# Patient Record
Sex: FEMALE | Race: AMERICAN INDIAN OR ALASKA NATIVE | Employment: FULL TIME | URBAN - METROPOLITAN AREA
[De-identification: names, ages, dates, MRNs, and addresses within clinical notes are randomized per-mention and may not be internally consistent; named-entity substitution may affect disease eponyms.]

---

## 2018-09-20 ENCOUNTER — TRANSFERRED RECORDS (OUTPATIENT)
Dept: HEALTH INFORMATION MANAGEMENT | Facility: CLINIC | Age: 68
End: 2018-09-20

## 2019-02-06 ENCOUNTER — DOCUMENTATION ONLY (OUTPATIENT)
Dept: CARE COORDINATION | Facility: CLINIC | Age: 69
End: 2019-02-06

## 2019-02-11 NOTE — TELEPHONE ENCOUNTER
FUTURE VISIT INFORMATION      FUTURE VISIT INFORMATION:    Date: 2/15/19    Time: 9:20AM    Location: [facility] [place]  REFERRAL INFORMATION:    Referring provider:  Kayce Suarez DNP    Referring providers clinic:  [facility]    Reason for visit/diagnosis:  Difficulty swallowing food     NOTES STATUS DETAILS   OFFICE NOTE from referring provider Internal 9/20/18   OFFICE NOTE from other specialist N/A    DISCHARGE SUMMARY from hospital Internal    OPERATIVE REPORT N/A    MEDICATION LIST Received         ENDOSCOPY  N/A    COLONOSCOPY Care Everywhere Flex Sig 9/11/17   ERCP N/A    EUS N/A    STOOL TESTING Care Everywhere    PERTINENT LABS Care Everywhere    PATHOLOGY REPORTS (RELATED) N/A    IMAGING (CT, MRI, EGD) Care Everywhere PACS

## 2019-02-14 ENCOUNTER — TELEPHONE (OUTPATIENT)
Dept: GASTROENTEROLOGY | Facility: CLINIC | Age: 69
End: 2019-02-14

## 2019-02-14 NOTE — TELEPHONE ENCOUNTER
Called and received message stating vmail box full, unable to leave reminder for patient of appointment for 2/15/19 at 9:20AM.

## 2019-02-15 ENCOUNTER — OFFICE VISIT (OUTPATIENT)
Dept: GASTROENTEROLOGY | Facility: CLINIC | Age: 69
End: 2019-02-15
Payer: MEDICARE

## 2019-02-15 ENCOUNTER — TELEPHONE (OUTPATIENT)
Dept: GASTROENTEROLOGY | Facility: CLINIC | Age: 69
End: 2019-02-15

## 2019-02-15 ENCOUNTER — PRE VISIT (OUTPATIENT)
Dept: GASTROENTEROLOGY | Facility: CLINIC | Age: 69
End: 2019-02-15

## 2019-02-15 VITALS
TEMPERATURE: 97.5 F | DIASTOLIC BLOOD PRESSURE: 73 MMHG | SYSTOLIC BLOOD PRESSURE: 152 MMHG | HEART RATE: 85 BPM | WEIGHT: 179.7 LBS | OXYGEN SATURATION: 97 % | BODY MASS INDEX: 33.07 KG/M2 | HEIGHT: 62 IN

## 2019-02-15 DIAGNOSIS — R13.10 DYSPHAGIA, UNSPECIFIED TYPE: ICD-10-CM

## 2019-02-15 DIAGNOSIS — K21.9 GASTROESOPHAGEAL REFLUX DISEASE, ESOPHAGITIS PRESENCE NOT SPECIFIED: Primary | ICD-10-CM

## 2019-02-15 RX ORDER — NICOTINE POLACRILEX 4 MG/1
20 GUM, CHEWING ORAL DAILY
Qty: 90 TABLET | Refills: 3 | Status: SHIPPED | OUTPATIENT
Start: 2019-02-15 | End: 2020-02-15

## 2019-02-15 RX ORDER — ESCITALOPRAM OXALATE 10 MG/1
TABLET ORAL
Refills: 3 | COMMUNITY
Start: 2019-01-26

## 2019-02-15 RX ORDER — CHOLECALCIFEROL (VITAMIN D3) 50 MCG
1 TABLET ORAL DAILY
COMMUNITY

## 2019-02-15 RX ORDER — PERPHENAZINE/AMITRIPTYLINE HCL 4MG-10MG
TABLET ORAL DAILY
COMMUNITY

## 2019-02-15 RX ORDER — BUPROPION HYDROCHLORIDE 150 MG/1
150 TABLET, EXTENDED RELEASE ORAL
COMMUNITY
Start: 2016-12-26

## 2019-02-15 RX ORDER — ATORVASTATIN CALCIUM 40 MG/1
TABLET, FILM COATED ORAL
Refills: 1 | COMMUNITY
Start: 2019-01-26

## 2019-02-15 RX ORDER — LEVOTHYROXINE SODIUM 137 UG/1
TABLET ORAL
Refills: 2 | COMMUNITY
Start: 2019-01-26

## 2019-02-15 RX ORDER — OXYBUTYNIN CHLORIDE 10 MG/1
1 TABLET, EXTENDED RELEASE ORAL
COMMUNITY
Start: 2018-05-01

## 2019-02-15 RX ORDER — LISINOPRIL 10 MG/1
TABLET ORAL
Refills: 1 | COMMUNITY
Start: 2018-10-29

## 2019-02-15 RX ORDER — CYCLOBENZAPRINE HCL 5 MG
TABLET ORAL
Refills: 0 | COMMUNITY
Start: 2019-01-24

## 2019-02-15 RX ORDER — CLOPIDOGREL BISULFATE 75 MG/1
75 TABLET ORAL DAILY
Refills: 0 | COMMUNITY
Start: 2019-02-03

## 2019-02-15 SDOH — HEALTH STABILITY: MENTAL HEALTH: HOW OFTEN DO YOU HAVE A DRINK CONTAINING ALCOHOL?: NEVER

## 2019-02-15 ASSESSMENT — ENCOUNTER SYMPTOMS
BACK PAIN: 1
HOARSE VOICE: 0
MYALGIAS: 1
JOINT SWELLING: 0
SORE THROAT: 0
TROUBLE SWALLOWING: 1
MUSCLE WEAKNESS: 0
NECK PAIN: 0
SMELL DISTURBANCE: 0
TASTE DISTURBANCE: 0
NECK MASS: 0
STIFFNESS: 0
MUSCLE CRAMPS: 0
ARTHRALGIAS: 0
SINUS PAIN: 0
SINUS CONGESTION: 0

## 2019-02-15 ASSESSMENT — PATIENT HEALTH QUESTIONNAIRE - PHQ9
SUM OF ALL RESPONSES TO PHQ QUESTIONS 1-9: 6
10. IF YOU CHECKED OFF ANY PROBLEMS, HOW DIFFICULT HAVE THESE PROBLEMS MADE IT FOR YOU TO DO YOUR WORK, TAKE CARE OF THINGS AT HOME, OR GET ALONG WITH OTHER PEOPLE: VERY DIFFICULT
SUM OF ALL RESPONSES TO PHQ QUESTIONS 1-9: 6

## 2019-02-15 ASSESSMENT — MIFFLIN-ST. JEOR: SCORE: 1288.36

## 2019-02-15 ASSESSMENT — PAIN SCALES - GENERAL: PAINLEVEL: NO PAIN (0)

## 2019-02-15 NOTE — PROGRESS NOTES
GI CLINIC VISIT  2/15/19    CC/REFERRING MD: Kayce Suarez DNP  REASON FOR CONSULTATION: Difficulty Swallowing     ASSESSMENT/PLAN:  Adilia Bhardwaj is a 68 year old female with past medical history of CVA x 3, prior cholecystectomy, COPD, bilateral angioplasty of the right and left iliac arteries who is presenting for evaluation of dysphagia.     1. Dysphagia/Odynophagia  2. Reflux  Differential diagnosis includes esophagitis in setting of increased reflux, malignancy (given smoking history, progressive dysphagia), esophageal dysmotility, peptic stricture, etc. Will start PPI for reflux symptoms and schedule EGD with MAC given significant co-morbidities for further evaluation.    -- EGD with anesthesia at your convenience  -- GERD lifestyle modifications  -- Start omeprazole 20 mg 30 minutes prior to breakfast for acid reflux  -- Smoking cessation counseling    RTC 3 months    Thank you for this consultation.  It was a pleasure to participate in the care of this patient; please contact us with any further questions.     Plan of care discussed with Dr. Moreno, gastroenterology attending physician.     Dr. Caroline Quintana/Dafne MARTINEZ CNP  Division of Gastroenterology, Hepatology and Nutrition  [facility]  Adilia Bhardwaj is a 68 year old female with past medical history of CVA x 3, prior cholecystectomy, COPD, bilateral angioplasty of the right and left iliac arteries who is presenting for evaluation of dysphagia.     Patient reports difficulty swallowing food and associated odynophagia with both food and liquids for the past two weeks. Previously just food for the past six months or so. She feels like oral intake gets stuck in her lower neck. She has also noted hoarseness of her voice for the past 2-3 days. She has had significant heartburn and acid regurgitation over the past 2-3 months, worse at night. She is taking Tums for this. Her appetite is stable. She denies shortness of breath, skin  rashes. She has two bowel movements daily, no melena or hematochezia.     Reports she has never had an EGD. Had a colonoscopy one year ago and was told she had polyps.     ROS:  10 point ROS reviewed and negative aside from what is noted in HPI.     PERTINENT PAST MEDICAL HISTORY:  Hypertension  Cardiovascular accident x 3  COPD    PREVIOUS SURGERIES:  Cholecystectomy  Bilateral iliac artery angioplasty  Hysterectomy    PREVIOUS ENDOSCOPY: Reviewed.     ALLERGIES: No known drug allergies.     PERTINENT MEDICATIONS: Reviewed.     Current Outpatient Medications:      acetaminophen-codeine (TYLENOL #3) 300-30 MG tablet, TAKE ONE TAB BY MOUTH EVERY 12 HOURS AS NEEDED FOR PAIN, Disp: , Rfl: 0     amitriptyline (ELAVIL) 25 MG tablet, TAKE ONE TABLET BY MOUTH EVERY EVENING FOR INSOMNIA, Disp: , Rfl: 0     atorvastatin (LIPITOR) 40 MG tablet, TAKE ONE TABLET BY MOUTH DAILY AT BEDTIME, Disp: , Rfl: 1     buPROPion (WELLBUTRIN SR) 150 MG 12 hr tablet, Take 150 mg by mouth, Disp: , Rfl:      clopidogrel (PLAVIX) 75 MG tablet, Take 75 mg by mouth daily, Disp: , Rfl: 0     Coconut Oil 1000 MG CAPS, Take by mouth daily, Disp: , Rfl:      cyclobenzaprine (FLEXERIL) 5 MG tablet, TAKE 1/2 TABLET EVERY 8-12 HOURS AS NEEDED FOR MUSCLE SPASM., Disp: , Rfl: 0     escitalopram (LEXAPRO) 10 MG tablet, TAKE 1 TABLET BY MOUTH ONCE DAILY, MAY INCREASE TO TWO TABLETS AFTER ONE WEEK IF TOLERATING, Disp: , Rfl: 3     levothyroxine (SYNTHROID/LEVOTHROID) 137 MCG tablet, TAKE ONE BY MOUTH DAILY, Disp: , Rfl: 2     lisinopril (PRINIVIL/ZESTRIL) 10 MG tablet, TAKE ONE BY MOUTH DAILY - FOR BLOOD PRESSURE, Disp: , Rfl: 1     omeprazole 20 MG tablet, Take 1 tablet (20 mg) by mouth daily, Disp: 90 tablet, Rfl: 3     oxybutynin ER (DITROPAN-XL) 10 MG 24 hr tablet, Take 1 tablet by mouth, Disp: , Rfl:      tiZANidine (ZANAFLEX) 4 MG tablet, Take 4 mg by mouth 3 times daily, Disp: , Rfl:      vitamin D3 (CHOLECALCIFEROL) 2000 units tablet, Take 1 tablet  by mouth daily, Disp: , Rfl:     SOCIAL HISTORY:  Social History     Socioeconomic History     Marital status: Single     Spouse name: Not on file     Number of children: Not on file     Years of education: Not on file     Highest education level: Not on file   Social Needs     Financial resource strain: Not on file     Food insecurity - worry: Not on file     Food insecurity - inability: Not on file     Transportation needs - medical: Not on file     Transportation needs - non-medical: Not on file   Occupational History     Not on file   Tobacco Use     Smoking status: Current Every Day Smoker     Types: Cigarettes     Smokeless tobacco: Never Used   Substance and Sexual Activity     Alcohol use: No     Frequency: Never     Drug use: No     Sexual activity: Not on file   Other Topics Concern     Not on file   Social History Narrative     Not on file   Smokes six cigarettes daily. No alcohol or recreational drug use.     FAMILY HISTORY:  FH of CRC:None   FH of IBD: None     Past/family/social history reviewed and no changes    PHYSICAL EXAMINATION:  Vitals reviewed: There were no vitals taken for this visit.  Wt:   Wt Readings from Last 2 Encounters:   No data found for Wt   Constitutional: aaox3, cooperative, pleasant, not dyspneic/diaphoretic, no acute distress  Eyes: Sclera anicteric/injected  Ears/nose/mouth/throat: Normal oropharynx without ulcers or exudate, mucus membranes moist, hearing intact  Neck: supple, thyroid normal size  CV: No edema  Respiratory: Unlabored breathing  Lymph: No axillary, submandibular, supraclavicular lymphadenopathy  Abd:  Nondistended, +bs, no hepatosplenomegaly, nontender, no peritoneal signs  Skin: warm, perfused, no jaundice  Psych: Normal affect  MSK: Normal gait      PERTINENT STUDIES: Reviewed.

## 2019-02-15 NOTE — Clinical Note
2/15/2019       RE: Adilia Bhardwaj  [address] [facility] [address]     Dear Colleague,    Thank you for referring your patient, Adilia Bhardwaj, to the [facility] GASTROENTEROLOGY AND IBD CLINIC at [facility]. Please see a copy of my visit note below.    GI CLINIC VISIT    CC/REFERRING MD: Kayce Suarez DNP  REASON FOR CONSULTATION: Difficulty Swallowing     ASSESSMENT/PLAN:  Adilia Bhardwaj woman with past medical history of CVA x 3,ventricular hypertrophy, cholecystectomy, bilateral angioplasty of the right and left iliac arteries, hysterectomy (1978), ischemic colitis, vertigo, PAD,insomnia, HTN, cervical CA, and  urinary frequency.    Reviewed the chart and care everywhere for records.     1. Dysphagia: Patient presents with six months history of progressive dysphagia with solid foods and liquids in the setting of ongoing heartburn, new onset of hoarseness and 93 pack year history of cigarettes smoking. She has not tried any PPI in the past. We discussed with patient that her dysphagia could be related to peptic stricture as a result of ongoing reflux/GERD. Also in consideration of her age, long term history of smoking in the setting of progressive dysphagia with solids and liquids it could be related to malignancy. We discussed the need to do a trial of PPI 20 mg daily, and an EGD wit MAC considering her his cardiovascular problem and COPD.     --EGD with MAC   --PPI 20 mg Daily   --Reflux/heartburn/GERD relief:  --Prop actual bed up, not just with pillows.Propping up with just pillows can actually make things worse if it is causing you to bend at the       waist while sleeping  -- Avoid alcohol, as this causes relaxation of the sphincter and makes it easier for food to travel up esophagus.  If you do drink alcohol, do      not drink before bed or before meals.  -- Eat small meals. Avoid overeating.  -- Avoid triggers such as fatty foods, processed foods, and high  fructose corn syrup (or food that contain these)  -- Weight loss   --  on the need to quit smoking       Colorectal cancer screening:    RTC 4-6 weeks    Thank you for this consultation.  It was a pleasure to participate in the care of this patient; please contact us with any further questions.       Dr. Caroline Quintana/Dafne MARTINEZ CNP  Division of Gastroenterology, Hepatology and Nutrition  [facility]      HPI  Adilia Bhardwaj woman with past medical history of CVA x 3, ventricular hypertrophy, cholecystectomy, bilateral angioplasty of the right and left iliac arteries, hysterectomy (1978), ischemic colitis, vertigo, PAD,insomnia, HTN, cervical CA, and  urinary frequency.      Patient reports that she has had a two weeks history of difficulty swallowing solid foods and liquids, with ongoing symptoms of heart burn, sensation of food getting stuck in her throat area and a new onset (3 days) of hoarseness. She denies every needing to go the the ED for food getting stuck. She denies regurgitation, weight loss, bloody stools. She reports having two form  bowel movement a day,  denies using alcohol but admits to smokeing 6 cigarettes daily. She reports significant family history of lung cancer.     ROS:    No fevers or chills  No weight loss  No blurry vision, double vision or change in vision  No sore throat  No lymphadenopathy  No headache, paraesthesias, or weakness in a limb  + shortness of breath or wheezing  No chest pain or pressure  + arthralgias or myalgias  No rashes or skin changes  + odynophagia or dysphagia  No BRBPR, hematochezia, melena  + dysuria, frequency or urgency  No hot/cold intolerance or polyria  + anxiety or depression- off and on  PROBLEM LIST  There are no active problems to display for this patient.    PERTINENT PAST MEDICAL HISTORY:  Hypertension  Cardiovascular accident x 3  Ischemic colitis  Peripheral artery disease  No past medical history on file.    PREVIOUS  SURGERIES:  Cholecystectomy  Bilateral angioplasty  Hysterectomy  No past surgical history on file.    PREVIOUS ENDOSCOPY:  ALLERGIES:   Allergies not on file    PERTINENT MEDICATIONS:  No current outpatient medications on file.    SOCIAL HISTORY:  Social History     Socioeconomic History     Marital status: Single     Spouse name: Not on file     Number of children: Not on file     Years of education: Not on file     Highest education level: Not on file   Social Needs     Financial resource strain: Not on file     Food insecurity - worry: Not on file     Food insecurity - inability: Not on file     Transportation needs - medical: Not on file     Transportation needs - non-medical: Not on file   Occupational History     Not on file   Tobacco Use     Smoking status: Current Every Day Smoker     Types: Cigarettes     Smokeless tobacco: Never Used   Substance and Sexual Activity     Alcohol use: No     Frequency: Never     Drug use: No     Sexual activity: Not on file   Other Topics Concern     Not on file   Social History Narrative     Not on file     FAMILY HISTORY:  FH of CRC:None   FH of IBD: None   No family history on file.    Past/family/social history reviewed and no changes    PHYSICAL EXAMINATION:  Constitutional: aaox3, cooperative, pleasant, not dyspneic/diaphoretic, no acute distress  Vitals reviewed: There were no vitals taken for this visit.  Wt:   Wt Readings from Last 2 Encounters:   No data found for Wt      Eyes: Sclera anicteric/injected  Ears/nose/mouth/throat: Normal oropharynx without ulcers or exudate, mucus membranes moist, hearing intact  Neck: supple, thyroid normal size  CV: No edema  Respiratory: Unlabored breathing  Lymph: No axillary, submandibular, supraclavicular or inguinal lymphadenopathy  Abd:  Nondistended, +bs, no hepatosplenomegaly, nontender, no peritoneal signs  Skin: warm, perfused, no jaundice  Psych: Normal affect  MSK: Normal gait      PERTINENT STUDIES:    No results found  for any previous visit.       Answers for HPI/ROS submitted by the patient on 2/15/2019   General Symptoms: No  Skin Symptoms: No  HENT Symptoms: Yes  EYE SYMPTOMS: No  HEART SYMPTOMS: No  LUNG SYMPTOMS: No  INTESTINAL SYMPTOMS: No  URINARY SYMPTOMS: No  GYNECOLOGIC SYMPTOMS: No  BREAST SYMPTOMS: No  SKELETAL SYMPTOMS: Yes  BLOOD SYMPTOMS: No  NERVOUS SYSTEM SYMPTOMS: No  MENTAL HEALTH SYMPTOMS: No  Ear pain: No  Ear discharge: No  Hearing loss: No  Tinnitus: No  Nosebleeds: No  Congestion: No  Sinus pain: No  Trouble swallowing: Yes   Voice hoarseness: No  Mouth sores: No  Sore throat: No  Tooth pain: No  Gum tenderness: No  Bleeding gums: No  Change in taste: No  Change in sense of smell: No  Dry mouth: No  Hearing aid used: No  Neck lump: No  Back pain: Yes  Muscle aches: Yes  Neck pain: No  Swollen joints: No  Joint pain: No  Bone pain: No  Muscle cramps: No  Muscle weakness: No  Joint stiffness: No  Bone fracture: No  If you checked off any problems, how difficult have these problems made it for you to do your work, take care of things at home, or get along with other people?: Very difficult  PHQ9 TOTAL SCORE: 6      I performed a history and physical examination of the above patient and discussed the management with Dr. Quintana on 2/15/2019. I reviewed the note and there are no changes to the past medical, family or social history.  A complete 10 point review of systems was obtained. Please see the HPI for pertinent positives and negatives. All other systems were reviewed and were found to be negative. I agree with the documented findings and plan of care as outlined.    Montana Moreno MD  GI Attending  Pager: [phone]    Again, thank you for allowing me to participate in the care of your patient.      Sincerely,    Caroline Quintana MD

## 2019-02-15 NOTE — LETTER
Date:February 25, 2019      Patient was self referred, no letter generated. Do not send.        [facility]

## 2019-02-15 NOTE — NURSING NOTE
"Chief Complaint   Patient presents with     New Patient     New patient consult, difficulty swallowing       Vitals:    02/15/19 0935   BP: 152/73   Pulse: 85   Temp: 97.5  F (36.4  C)   TempSrc: Oral   SpO2: 97%   Weight: 81.5 kg (179 lb 11.2 oz)   Height: 1.575 m (5' 2\")       Body mass index is 32.87 kg/m .    PEDRO Lee                          "

## 2019-02-15 NOTE — PROGRESS NOTES
I performed a history and physical examination of the above patient and discussed the management with Dr. Quintana on 2/15/2019. I reviewed the note and there are no changes to the past medical, family or social history.  A complete 10 point review of systems was obtained. Please see the HPI for pertinent positives and negatives. All other systems were reviewed and were found to be negative. I agree with the documented findings and plan of care as outlined.    Montana Moreno MD  GI Attending  Pager: [phone]

## 2019-02-15 NOTE — PATIENT INSTRUCTIONS
It was a pleasure taking care of you today.  I've included a brief summary of our discussion and care plan from today's visit below.  Please review this information with your primary care provider.  _______________________________________________________________________    My recommendations are summarized as follows:    -- EGD with anesthesia at your convenience  -- GERD lifestyle modifications  -- Start omeprazole 20 mg 30 minutes prior to breakfast for acid reflux    Return to GI Clinic in 3 months to review your progress.    _______________________________________________________________________    Who do I call with any questions after my visit?  Please be in touch if there are any further questions that arise following today's visit.  There are multiple ways to contact your gastroenterology care team.        During business hours, you may reach a Gastroenterology nurse at [phone].       To schedule or reschedule an appointment, please call [phone].       You can always send a secure message through [employer].  [employer] messages are answered by your nurse or doctor typically within 24 hours.  Please allow extra time on weekends and holidays.        For urgent/emergent questions after business hours, you may reach the on-call GI Fellow by contacting the [facility]  at [phone].     How will I get the results of any tests ordered?    You will receive all of your results.  If you have signed up for [employer], any tests ordered at your visit will be available to you after your physician reviews them.  Typically this takes 1-2 weeks.  If there are urgent results that require a change in your care plan, your physician or nurse will call you to discuss the next steps.      What is [employer]?  [employer] is a secure way for you to access all of your healthcare records from the [facility].  It is a web based computer program, so you can sign on to it from any location.  It also allows  you to send secure messages to your care team.  I recommend signing up for [employer] access if you have not already done so and are comfortable with using a computer.      How to I schedule a follow-up visit?  If you did not schedule a follow-up visit today, please call [phone] to schedule a follow-up office visit.        Sincerely,    Caroline Quintana MD  Gastroenterology Fellow  [facility]

## 2019-07-30 ENCOUNTER — TELEPHONE (OUTPATIENT)
Dept: GASTROENTEROLOGY | Facility: CLINIC | Age: 69
End: 2019-07-30

## 2019-11-25 DIAGNOSIS — K21.9 GASTROESOPHAGEAL REFLUX DISEASE, ESOPHAGITIS PRESENCE NOT SPECIFIED: Primary | ICD-10-CM

## 2019-11-25 DIAGNOSIS — R13.10 DYSPHAGIA, UNSPECIFIED TYPE: ICD-10-CM

## 2019-11-27 ENCOUNTER — TELEPHONE (OUTPATIENT)
Dept: GASTROENTEROLOGY | Facility: CLINIC | Age: 69
End: 2019-11-27

## 2019-11-27 NOTE — TELEPHONE ENCOUNTER
----- Message from Renetta Anthony RN sent at 11/27/2019  7:13 AM CST -----  Pt  SURESH PARKINSON [[id number]]      Please call to schedule. Caroline Irwin MD       I do not need any follow up on the scheduling of this appointment unless the patient will no longer be receiving care in our clinic.    Thanks.

## 2019-11-27 NOTE — TELEPHONE ENCOUNTER
2/15/2019  [facility] Gastroenterology and IBD Clinic   Caroline Quintana MD   NV:  None    RTC 3 months    Scheduling has been notified to contact the pt for appointment.      Routed because: past due for f/u.

## 2019-11-29 ENCOUNTER — PATIENT OUTREACH (OUTPATIENT)
Dept: GASTROENTEROLOGY | Facility: CLINIC | Age: 69
End: 2019-11-29

## 2019-11-29 NOTE — TELEPHONE ENCOUNTER
Contacted pt to let her know can not refill as she is overdue for appt. Pt was a no show for August appt. Pt given option to be transferred to call center, check with her primary care provider or buy over the counter. Pt stated will check in with her primary.

## 2020-01-28 NOTE — TELEPHONE ENCOUNTER
Omeprazole 20mg Delayed-Release Capsule   Last Written Prescription Date:  2/15/2019  Last Fill Quantity: 90,   # refills: 3  Last Office Visit : 2/15/2019  Future Office visit:  3 months, not scehduled    Routing refill request to provider for review/approval because:  On Clopidogrel

## 2020-01-30 ENCOUNTER — PATIENT OUTREACH (OUTPATIENT)
Dept: GASTROENTEROLOGY | Facility: CLINIC | Age: 70
End: 2020-01-30

## 2020-01-30 NOTE — PROGRESS NOTES
Request for refill for omeprazole. Pt seen February 2019.  No show in August for appt. No procedure scheduled  Mailbox is full   Unable to leave a message  Pt not active on my chart.   Left a message with pharmacy provider line

## 2024-01-16 ENCOUNTER — TRANSFERRED RECORDS (OUTPATIENT)
Dept: HEALTH INFORMATION MANAGEMENT | Facility: CLINIC | Age: 74
End: 2024-01-16

## 2024-04-09 ENCOUNTER — MEDICAL CORRESPONDENCE (OUTPATIENT)
Dept: HEALTH INFORMATION MANAGEMENT | Facility: CLINIC | Age: 74
End: 2024-04-09
Payer: COMMERCIAL

## 2024-04-19 ENCOUNTER — PATIENT OUTREACH (OUTPATIENT)
Dept: ONCOLOGY | Facility: CLINIC | Age: 74
End: 2024-04-19
Payer: COMMERCIAL

## 2024-04-19 ENCOUNTER — TRANSCRIBE ORDERS (OUTPATIENT)
Dept: OTHER | Age: 74
End: 2024-04-19

## 2024-04-19 DIAGNOSIS — Z72.0 TOBACCO ABUSE: ICD-10-CM

## 2024-04-19 DIAGNOSIS — R91.8 PULMONARY NODULES: Primary | ICD-10-CM

## 2024-04-19 DIAGNOSIS — J44.9 CHRONIC OBSTRUCTIVE PULMONARY DISEASE (H): ICD-10-CM

## 2024-04-19 NOTE — PROGRESS NOTES
New IP (Interventional Pulmonology) referral rec'd.  Chart reviewed.       New Patient: Interventional Pulmonary (Lung nodule) Nurse Navigator Note    Referring provider: RUBEN BRUNO affiliated with [place] [address] [facility] [address].     [facility] Care Team Referral Coordinator:  [phone]    Referred to (specialty): Interventional Pulmonary (Lung nodule)    Requested provider (if applicable): n/a    Date Referral Received: 4/19/2024    Evaluation for :  Lung nodule    Clinical History (per Nurse review of records provided):    **BOOK MARKED**    10/6/2023:  [facility]      Records Location: [facility]    RECORDS NEEDED:  Last FIVE years CHEST Imaging pushed to PACS----thank you!!    Additional testing needed prior to consult: PFT's & CT Chest

## 2024-04-22 ENCOUNTER — PRE VISIT (OUTPATIENT)
Dept: PULMONOLOGY | Facility: CLINIC | Age: 74
End: 2024-04-22
Payer: COMMERCIAL

## 2024-04-22 NOTE — TELEPHONE ENCOUNTER
RECORDS STATUS - ALL OTHER DIAGNOSIS      Referring Provider/Location:  Miriam Ken MD [facility]    Dx and Code: Pulmonary nodules [R91.8]  - Primary  Chronic obstructive pulmonary disease (H) [J44.9]  Appt Date:  TBD   Provider: SHIKHA     Action Taken  Date/Description  HECTOR     Nurse Navigator April 22, 2024  3:54 PM   Attempted to call Pt twice for imaging info and phone rings than is a fast busy signal (NIS?)   LVM for [facility] to CB with records and imaging info     Records Requested  TJ   Clinic name Comments/Action Taken   [facility] April 23, 2024 12:33 PM    Called and requested all chest images and reports over last 5 years  12:37 PM  Images received and resolved to PACS     RECORDS NEEDED: Last FIVE years CHEST Imaging pushed to PACS   NOTES STATUS COMMENTS   OFFICE NOTE from referring provider 4.20.24 Belly Ballot/[facility] Phone: [phone]   Fax: [phone]    OFFICE NOTE from medical oncologist     OFFICE NOTE from other specialist     DISCHARGE SUMMARY from hospital     DISCHARGE REPORT from the ER     OPERATIVE REPORT     MEDICATION LIST     LABS     PATHOLOGY REPORTS     ANYTHING RELATED TO DIAGNOSIS     GENONOMIC TESTING     TYPE:     IMAGING (NEED IMAGES & REPORT)     CT SCANS [facility] - Images received and resolved to PACS. Lung 10.6.22  Chest 9.26.23; 3.23.24   MRI     XRAYS     ULTRASOUND     PET

## 2024-04-25 NOTE — TELEPHONE ENCOUNTER
Action    Action Taken 4/25/24  Reports from [facility] received, sent to HIM for STAT upload, emailed to NN email, SALLIE VENCES   7:05 AM

## 2024-06-05 ENCOUNTER — OFFICE VISIT (OUTPATIENT)
Dept: NURSING | Facility: CLINIC | Age: 74
End: 2024-06-05
Payer: COMMERCIAL

## 2024-06-05 VITALS — BODY MASS INDEX: 32.14 KG/M2 | WEIGHT: 175.7 LBS | HEART RATE: 58 BPM | OXYGEN SATURATION: 98 %

## 2024-06-05 DIAGNOSIS — R91.8 PULMONARY NODULES: ICD-10-CM

## 2024-06-05 PROCEDURE — 94375 RESPIRATORY FLOW VOLUME LOOP: CPT | Performed by: INTERNAL MEDICINE

## 2024-06-05 PROCEDURE — 94726 PLETHYSMOGRAPHY LUNG VOLUMES: CPT | Performed by: INTERNAL MEDICINE

## 2024-06-05 PROCEDURE — 94729 DIFFUSING CAPACITY: CPT | Performed by: INTERNAL MEDICINE

## 2024-06-06 LAB
DLCOUNC-%PRED-PRE: 63 %
DLCOUNC-PRE: 10.56 ML/MIN/MMHG
DLCOUNC-PRED: 16.72 ML/MIN/MMHG
ERV-%PRED-PRE: 59 %
ERV-PRE: 0.45 L
ERV-PRED: 0.76 L
EXPTIME-PRE: 6.74 SEC
FEF2575-%PRED-PRE: 120 %
FEF2575-PRE: 1.75 L/SEC
FEF2575-PRED: 1.45 L/SEC
FEFMAX-%PRED-PRE: 110 %
FEFMAX-PRE: 5.04 L/SEC
FEFMAX-PRED: 4.56 L/SEC
FEV1-%PRED-PRE: 99 %
FEV1-PRE: 1.65 L
FEV1FEV6-PRE: 82 %
FEV1FEV6-PRED: 78 %
FEV1FVC-PRE: 82 %
FEV1FVC-PRED: 79 %
FEV1SVC-PRE: 87 %
FEV1SVC-PRED: 64 %
FIFMAX-PRE: 3.2 L/SEC
FRCPLETH-%PRED-PRE: 92 %
FRCPLETH-PRE: 2.3 L
FRCPLETH-PRED: 2.49 L
FVC-%PRED-PRE: 94 %
FVC-PRE: 2.01 L
FVC-PRED: 2.11 L
IC-%PRED-PRE: 96 %
IC-PRE: 1.46 L
IC-PRED: 1.52 L
RVPLETH-%PRED-PRE: 93 %
RVPLETH-PRE: 1.85 L
RVPLETH-PRED: 1.98 L
TLCPLETH-%PRED-PRE: 88 %
TLCPLETH-PRE: 3.76 L
TLCPLETH-PRED: 4.27 L
VA-%PRED-PRE: 88 %
VA-PRE: 3.44 L
VC-%PRED-PRE: 73 %
VC-PRE: 1.91 L
VC-PRED: 2.59 L

## 2025-01-20 ENCOUNTER — TELEPHONE (OUTPATIENT)
Dept: PULMONOLOGY | Facility: CLINIC | Age: 75
End: 2025-01-20
Payer: COMMERCIAL

## 2025-01-20 NOTE — TELEPHONE ENCOUNTER
Writer attempted to reach the patient and reschedule the PFT and appointment with Dr. Valdez, on 2/5/2025, Provider out of clinic.    Patient had a full voicemail, no MyChart. Appointment reschedule letters were sent.    Adilia AVILA/Iman Procedure    [facility]   Neurology, NeuroSurgery, NeuroPsychology, Pain Management and Cardiology Specialties  Medical/Surgical Adult Specialties

## 2025-02-05 ENCOUNTER — TELEPHONE (OUTPATIENT)
Dept: PULMONOLOGY | Facility: CLINIC | Age: 75
End: 2025-02-05

## 2025-02-05 DIAGNOSIS — J44.9 COPD (CHRONIC OBSTRUCTIVE PULMONARY DISEASE) (H): ICD-10-CM

## 2025-02-05 DIAGNOSIS — R91.8 PULMONARY NODULES: Primary | ICD-10-CM

## 2025-02-05 NOTE — TELEPHONE ENCOUNTER
Not Available for the patient to call back and schedule the following:    Appointment type: CXR  Provider: Malaika  Return date: 4/14/2025  Specialty phone number: [phone]  Additional appointment(s) needed: cxr  Additonal Notes: na

## 2025-02-06 NOTE — CONFIDENTIAL NOTE
RECORDS RECEIVED FROM: internal    DATE RECEIVED: 4.14.25    NOTES STATUS DETAILS   OFFICE NOTE from referring provider internal    Miriam Ken MD      MEDICATION LIST internal     IMAGING  (NEED IMAGES AND REPORTS)      CHEST XRAY (CXR) In process     TESTS     PULMONARY FUNCTION TESTING (PFT) internal  6.5.24     Scheduled 4.14.25        Action 2.6.25 sv    Action Taken Message sent to CC pool to help schedule CXR order for future appt

## 2025-04-14 ENCOUNTER — TELEPHONE (OUTPATIENT)
Dept: PULMONOLOGY | Facility: CLINIC | Age: 75
End: 2025-04-14

## 2025-04-14 ENCOUNTER — PRE VISIT (OUTPATIENT)
Dept: PULMONOLOGY | Facility: CLINIC | Age: 75
End: 2025-04-14

## 2025-04-14 NOTE — TELEPHONE ENCOUNTER
Left Voicemail (1st Attempt) for the patient to call back and schedule the following:    Appointment type: New Pul  Provider: NA  Return date: Next Avail  Specialty phone number: [phone]  Additional appointment(s) needed: full pft and cxr  Additonal Notes: per RN: I spoke with Dr. Dai and he is ok with this pt starting in [facility] pul. This pt would need a pft and a cxr before the appt. Could someone please call reschedule this pt. Thank you!

## 2025-04-16 ENCOUNTER — TELEPHONE (OUTPATIENT)
Dept: PULMONOLOGY | Facility: CLINIC | Age: 75
End: 2025-04-16
Payer: COMMERCIAL

## 2025-04-16 NOTE — TELEPHONE ENCOUNTER
Left Voicemail (1st Attempt) for the patient to call back and schedule the following:    Appointment type: cxr  Provider: Malaika  Return date: 7/7/2025  Specialty phone number: [phone]  Additional appointment(s) needed: cxr  Additonal Notes: na

## 2025-04-16 NOTE — CONFIDENTIAL NOTE
RECORDS RECEIVED FROM: internal    DATE RECEIVED:  7/7/25    NOTES STATUS DETAILS   OFFICE NOTE from referring provider internal    Miriam Ken MD      MEDICATION LIST internal     IMAGING  (NEED IMAGES AND REPORTS)     CHEST XRAY (CXR) In process      TESTS     PULMONARY FUNCTION TESTING (PFT) internal  6.5.24    Scheduled 7/7/25         Action 4.16.25 sv    Action Taken Message sent to CC pool to help schedule

## 2025-07-07 ENCOUNTER — PRE VISIT (OUTPATIENT)
Dept: PULMONOLOGY | Facility: CLINIC | Age: 75
End: 2025-07-07